# Patient Record
Sex: FEMALE | Race: BLACK OR AFRICAN AMERICAN | NOT HISPANIC OR LATINO | URBAN - METROPOLITAN AREA
[De-identification: names, ages, dates, MRNs, and addresses within clinical notes are randomized per-mention and may not be internally consistent; named-entity substitution may affect disease eponyms.]

---

## 2022-09-29 ENCOUNTER — OFFICE (OUTPATIENT)
Dept: URBAN - METROPOLITAN AREA CLINIC 11 | Facility: CLINIC | Age: 27
End: 2022-09-29
Payer: COMMERCIAL

## 2022-09-29 VITALS
RESPIRATION RATE: 18 BRPM | DIASTOLIC BLOOD PRESSURE: 81 MMHG | HEART RATE: 70 BPM | WEIGHT: 218 LBS | OXYGEN SATURATION: 99 % | SYSTOLIC BLOOD PRESSURE: 146 MMHG | HEIGHT: 66 IN

## 2022-09-29 DIAGNOSIS — K58.0 IRRITABLE BOWEL SYNDROME WITH DIARRHEA: ICD-10-CM

## 2022-09-29 DIAGNOSIS — K21.9 GASTRO-ESOPHAGEAL REFLUX DISEASE WITHOUT ESOPHAGITIS: ICD-10-CM

## 2022-09-29 DIAGNOSIS — R11.0 NAUSEA: ICD-10-CM

## 2022-09-29 DIAGNOSIS — K64.9 UNSPECIFIED HEMORRHOIDS: ICD-10-CM

## 2022-09-29 PROCEDURE — 99204 OFFICE O/P NEW MOD 45 MIN: CPT | Performed by: NURSE PRACTITIONER

## 2022-09-29 RX ORDER — PANTOPRAZOLE SODIUM 40 MG/1
40 TABLET, DELAYED RELEASE ORAL
Qty: 30 | Refills: 11 | Status: ACTIVE
Start: 2022-09-29

## 2022-09-29 NOTE — SERVICEHPINOTES
Ms. Sanon is a 26 year old female here today with complaints of diarrhea and epigastric pain. She reports intermittent diarrhea, burning in her stomach, and epigastric pain for the past few months. She states for the past week the diarrhea and pain have been consistent.  She is having 10-15 episodes of diarrhea a day. She does not take any over-the-counter PPI as her antacid medications.  She has occasional nausea, bloating, and gas as well. She has a history of IBS-D  and is followed by Dr.Randelon Ronquillo. Her last colonoscopy and EGD were in 2018. She had a 10 year recall on her colonoscopy.  She  states she has tried to call their office for the past 2 months for an appointment but could not get through to anyone. Her PCP suggested she come to our office. She saw her PCP on 09/15/2022 for her symptoms and was sent a medication to her pharmacy but she does not recall what it was. She never picked it up. The her PCP check her thyroid and it was normal. Her vitamin-D level was low and she was given a prescription for vitamin-D. She has taken Xifaxan in the past but states it made her feel worse.  She also has tried Viberzi but it did not help. She has a history of internal hemorrhoids found on her last colonoscopy.  She notices bright red blood when she wipes after her 5th to 6th bowel movement. She denies any vomiting, dysphagia, weight loss.

## 2022-09-29 NOTE — INTERFACERESULTNOTES
will need to leave another specimen because unable to run test due to cup being too full. Left a voicemail instructing she would need to leave another specimen and to call the office if she had any questions.

## 2022-09-29 NOTE — SERVICENOTES
She has a long history of IBS-D  and was followed by Dr. Alma Ronquillo.  will request records from his office and her PCP.  We will check stool studies and lactulose breath test.  Will treat with Xifaxan and neomycin if breath test is positive.  Will start on pantoprazole for reflux.  She will try Imodium for her diarrhea.  She is to follow-up in 3 months or sooner if needed.

## 2022-09-30 LAB
GI PROFILE, STOOL, PCR: CAMPYLOBACTER: (no result)
LACTOFERRIN, FECAL, QUANT.: <1 UG/ML(G)
REQUEST PROBLEM: (no result)